# Patient Record
Sex: MALE | Race: WHITE | NOT HISPANIC OR LATINO | URBAN - METROPOLITAN AREA
[De-identification: names, ages, dates, MRNs, and addresses within clinical notes are randomized per-mention and may not be internally consistent; named-entity substitution may affect disease eponyms.]

---

## 2022-08-17 ENCOUNTER — OFFICE (OUTPATIENT)
Dept: URBAN - METROPOLITAN AREA CLINIC 11 | Facility: CLINIC | Age: 72
End: 2022-08-17
Payer: COMMERCIAL

## 2022-08-17 VITALS
HEIGHT: 71 IN | SYSTOLIC BLOOD PRESSURE: 126 MMHG | OXYGEN SATURATION: 95 % | DIASTOLIC BLOOD PRESSURE: 61 MMHG | HEART RATE: 75 BPM | WEIGHT: 232 LBS

## 2022-08-17 DIAGNOSIS — R63.0 ANOREXIA: ICD-10-CM

## 2022-08-17 DIAGNOSIS — R63.4 ABNORMAL WEIGHT LOSS: ICD-10-CM

## 2022-08-17 DIAGNOSIS — R85.89 OTHER ABNORMAL FINDINGS IN SPECIMENS FROM DIGESTIVE ORGANS A: ICD-10-CM

## 2022-08-17 DIAGNOSIS — Z86.010 PERSONAL HISTORY OF COLONIC POLYPS: ICD-10-CM

## 2022-08-17 PROCEDURE — 99203 OFFICE O/P NEW LOW 30 MIN: CPT

## 2022-08-17 NOTE — SERVICEHPINOTES
Miguel Ángel is an extremely pleasant 72-year-old white male who presents today after referral from his PCP, Dr Lopez for positive fecal occult blood test.  Patient denies any nausea, vomiting, dysphagia, odynophagia.  He does endorse decreased appetite over the last 3 months.  He reports having a back surgery approximately 3 months ago with Dr. Forte and since that time  the has had a decreased urge to eat.  He also believes this is related to having his upper teeth pulled and having dentures.  He reports eating with something that does not feel real decreases his urge to eat.  He has lost approximately 20 lb over the last 2-3 months because of this.  He reports having a bowel movement every day, occasionally more often.  He denies any melena, hematochezia, mucus, rectal pain or abdominal pain. he does endorse a family history of colon cancer and his father, who was diagnosed at approximately age 73. he has had previous colonoscopies.  His last was in August of 2011 and was notable for a single small hyperplastic polyp.  Patient does endorse daily tobacco use with cigarettes, 1 pack per day.  He denies any alcohol or drug use.  He endorses only rare NSAID use other than his daily baby aspirin.  He does have a cardiac history but is a relatively poor historian when it comes to this.  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  he is unsure if he is on dual anti-platelet therapy with aspirin and Plavix or if he is just on aspirin.  He reports his wife takes care of all his medicines.  He is followed by Constantin Tyson as his cardiologist.  He denies any known pulmonary, renal, hematologic or neurologic disorders.

## 2022-10-01 ENCOUNTER — AMBULATORY SURGICAL CENTER (OUTPATIENT)
Dept: URBAN - METROPOLITAN AREA SURGERY 2 | Facility: SURGERY | Age: 72
End: 2022-10-01
Payer: COMMERCIAL

## 2022-10-01 ENCOUNTER — OFFICE (OUTPATIENT)
Dept: URBAN - METROPOLITAN AREA PATHOLOGY 20 | Facility: PATHOLOGY | Age: 72
End: 2022-10-01
Payer: COMMERCIAL

## 2022-10-01 VITALS
HEART RATE: 69 BPM | HEART RATE: 67 BPM | SYSTOLIC BLOOD PRESSURE: 185 MMHG | WEIGHT: 225 LBS | OXYGEN SATURATION: 92 % | RESPIRATION RATE: 22 BRPM | DIASTOLIC BLOOD PRESSURE: 48 MMHG | RESPIRATION RATE: 16 BRPM | TEMPERATURE: 97.6 F | TEMPERATURE: 97.6 F | HEART RATE: 68 BPM | RESPIRATION RATE: 21 BRPM | DIASTOLIC BLOOD PRESSURE: 51 MMHG | SYSTOLIC BLOOD PRESSURE: 111 MMHG | SYSTOLIC BLOOD PRESSURE: 106 MMHG | SYSTOLIC BLOOD PRESSURE: 185 MMHG | DIASTOLIC BLOOD PRESSURE: 98 MMHG | RESPIRATION RATE: 16 BRPM | SYSTOLIC BLOOD PRESSURE: 116 MMHG | DIASTOLIC BLOOD PRESSURE: 65 MMHG | HEIGHT: 71 IN | DIASTOLIC BLOOD PRESSURE: 54 MMHG | SYSTOLIC BLOOD PRESSURE: 111 MMHG | DIASTOLIC BLOOD PRESSURE: 65 MMHG | DIASTOLIC BLOOD PRESSURE: 98 MMHG | DIASTOLIC BLOOD PRESSURE: 51 MMHG | SYSTOLIC BLOOD PRESSURE: 185 MMHG | SYSTOLIC BLOOD PRESSURE: 106 MMHG | DIASTOLIC BLOOD PRESSURE: 48 MMHG | RESPIRATION RATE: 22 BRPM | WEIGHT: 225 LBS | HEART RATE: 69 BPM | OXYGEN SATURATION: 97 % | RESPIRATION RATE: 16 BRPM | HEART RATE: 69 BPM | SYSTOLIC BLOOD PRESSURE: 103 MMHG | DIASTOLIC BLOOD PRESSURE: 54 MMHG | DIASTOLIC BLOOD PRESSURE: 51 MMHG | OXYGEN SATURATION: 97 % | HEART RATE: 67 BPM | HEART RATE: 72 BPM | RESPIRATION RATE: 21 BRPM | WEIGHT: 225 LBS | SYSTOLIC BLOOD PRESSURE: 106 MMHG | OXYGEN SATURATION: 92 % | TEMPERATURE: 97.6 F | SYSTOLIC BLOOD PRESSURE: 103 MMHG | HEART RATE: 68 BPM | HEIGHT: 71 IN | HEART RATE: 67 BPM | TEMPERATURE: 98.7 F | OXYGEN SATURATION: 97 % | DIASTOLIC BLOOD PRESSURE: 98 MMHG | SYSTOLIC BLOOD PRESSURE: 111 MMHG | HEART RATE: 72 BPM | RESPIRATION RATE: 22 BRPM | DIASTOLIC BLOOD PRESSURE: 65 MMHG | DIASTOLIC BLOOD PRESSURE: 54 MMHG | HEART RATE: 72 BPM | SYSTOLIC BLOOD PRESSURE: 116 MMHG | HEART RATE: 68 BPM | SYSTOLIC BLOOD PRESSURE: 116 MMHG | TEMPERATURE: 98.7 F | RESPIRATION RATE: 21 BRPM | HEIGHT: 71 IN | OXYGEN SATURATION: 92 % | SYSTOLIC BLOOD PRESSURE: 103 MMHG | DIASTOLIC BLOOD PRESSURE: 48 MMHG | TEMPERATURE: 98.7 F

## 2022-10-01 DIAGNOSIS — D12.4 BENIGN NEOPLASM OF DESCENDING COLON: ICD-10-CM

## 2022-10-01 DIAGNOSIS — R19.5 OTHER FECAL ABNORMALITIES: ICD-10-CM

## 2022-10-01 DIAGNOSIS — D12.2 BENIGN NEOPLASM OF ASCENDING COLON: ICD-10-CM

## 2022-10-01 DIAGNOSIS — K63.5 POLYP OF COLON: ICD-10-CM

## 2022-10-01 DIAGNOSIS — D12.8 BENIGN NEOPLASM OF RECTUM: ICD-10-CM

## 2022-10-01 PROBLEM — K62.1 RECTAL POLYP: Status: ACTIVE | Noted: 2022-10-01

## 2022-10-01 PROCEDURE — 45380 COLONOSCOPY AND BIOPSY: CPT | Mod: 59 | Performed by: INTERNAL MEDICINE

## 2022-10-01 PROCEDURE — 45385 COLONOSCOPY W/LESION REMOVAL: CPT | Performed by: INTERNAL MEDICINE

## 2022-10-01 PROCEDURE — G8918 PT W/O PREOP ORDER IV AB PRO: HCPCS | Performed by: INTERNAL MEDICINE

## 2022-10-01 RX ADMIN — PROPOFOL 400 MG: 10 INJECTION, EMULSION INTRAVENOUS at 08:51
